# Patient Record
Sex: FEMALE | Race: WHITE | NOT HISPANIC OR LATINO | Employment: OTHER | URBAN - METROPOLITAN AREA
[De-identification: names, ages, dates, MRNs, and addresses within clinical notes are randomized per-mention and may not be internally consistent; named-entity substitution may affect disease eponyms.]

---

## 2018-04-23 ENCOUNTER — OFFICE VISIT (OUTPATIENT)
Dept: OPHTHALMOLOGY | Facility: CLINIC | Age: 83
End: 2018-04-23
Payer: MEDICARE

## 2018-04-23 DIAGNOSIS — H04.129 DRYNESS, EYE: ICD-10-CM

## 2018-04-23 DIAGNOSIS — I10 ESSENTIAL HYPERTENSION: ICD-10-CM

## 2018-04-23 DIAGNOSIS — H35.3131 EARLY DRY STAGE NONEXUDATIVE AGE-RELATED MACULAR DEGENERATION OF BOTH EYES: Primary | ICD-10-CM

## 2018-04-23 PROCEDURE — 92004 COMPRE OPH EXAM NEW PT 1/>: CPT | Mod: S$GLB,,, | Performed by: OPHTHALMOLOGY

## 2018-04-23 PROCEDURE — 99999 PR PBB SHADOW E&M-NEW PATIENT-LVL II: CPT | Mod: PBBFAC,,, | Performed by: OPHTHALMOLOGY

## 2018-04-23 RX ORDER — VALSARTAN 160 MG/1
TABLET ORAL
COMMUNITY
Start: 2018-02-07

## 2018-04-23 RX ORDER — VALSARTAN 160 MG/1
160 TABLET ORAL DAILY
COMMUNITY
End: 2018-04-23

## 2018-04-23 RX ORDER — HYDROCHLOROTHIAZIDE 12.5 MG/1
CAPSULE ORAL
COMMUNITY
Start: 2018-02-23

## 2018-04-23 RX ORDER — HYDROCHLOROTHIAZIDE 12.5 MG/1
12.5 CAPSULE ORAL DAILY
COMMUNITY
End: 2018-04-23

## 2018-04-23 RX ORDER — AMLODIPINE BESYLATE 5 MG/1
TABLET ORAL
COMMUNITY
Start: 2017-09-15

## 2018-04-23 RX ORDER — AMLODIPINE BESYLATE 5 MG/1
5 TABLET ORAL DAILY
COMMUNITY
End: 2018-04-23

## 2018-04-23 NOTE — PROGRESS NOTES
===============================  04/23/2018   Dania Rich,   83 y.o. female   Last visit [facility]: :12/10/2013   Last visit eye dept. Visit date not found  [facility]:  Corrected distance visual acuity was 20/40 in the right eye and 20/60 in the left eye.  Tonometry     Tonometry (Applanation, 1:20 PM)       Right Left    Pressure 15 18              Wearing Rx     Wearing Rx       Sphere Cylinder Axis Add    Right -0.50 +0.50 030 +3.00    Left -2.50 +3.00 175 +3.00    Type:  Bifocal              Manifest Refraction     Manifest Refraction       Sphere Cylinder Axis Dist [facility]    Right -0.50 +0.50 175 20/40    Left -2.25 +2.75 015 20/40              Chief Complaint   Patient presents with    Concerns About Ocular Health     eye exam        HPI     Concerns About Ocular Health    Additional comments: eye exam           Comments   NO DM  PCIOL-OU 4 YRS AGO DR. NUNEZ  YAG OS 10/16/13  YAG OD 11/7/13  PCO OD  YAG capsultomy  Total Energy: 3.4  Total # of Exposures: 11       Last edited by Cindi Toribio on 4/23/2018  1:03 PM. 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